# Patient Record
Sex: FEMALE | Race: ASIAN
[De-identification: names, ages, dates, MRNs, and addresses within clinical notes are randomized per-mention and may not be internally consistent; named-entity substitution may affect disease eponyms.]

---

## 2021-01-11 ENCOUNTER — HOSPITAL ENCOUNTER (EMERGENCY)
Dept: HOSPITAL 54 - ER | Age: 71
Discharge: HOME | End: 2021-01-11
Payer: MEDICARE

## 2021-01-11 VITALS — WEIGHT: 135 LBS | BODY MASS INDEX: 26.5 KG/M2 | HEIGHT: 60 IN

## 2021-01-11 VITALS — DIASTOLIC BLOOD PRESSURE: 88 MMHG | SYSTOLIC BLOOD PRESSURE: 154 MMHG

## 2021-01-11 DIAGNOSIS — R11.0: Primary | ICD-10-CM

## 2021-01-11 DIAGNOSIS — Z88.2: ICD-10-CM

## 2021-01-11 DIAGNOSIS — R55: ICD-10-CM

## 2021-01-11 DIAGNOSIS — R53.1: ICD-10-CM

## 2021-01-11 DIAGNOSIS — E11.9: ICD-10-CM

## 2021-01-11 LAB
ALBUMIN SERPL BCP-MCNC: 4.2 G/DL (ref 3.4–5)
ALP SERPL-CCNC: 69 U/L (ref 46–116)
ALT SERPL W P-5'-P-CCNC: 36 U/L (ref 12–78)
AST SERPL W P-5'-P-CCNC: 27 U/L (ref 15–37)
BASOPHILS # BLD AUTO: 0 /CMM (ref 0–0.2)
BASOPHILS NFR BLD AUTO: 0.3 % (ref 0–2)
BILIRUB DIRECT SERPL-MCNC: 0.1 MG/DL (ref 0–0.2)
BILIRUB SERPL-MCNC: 0.6 MG/DL (ref 0.2–1)
BUN SERPL-MCNC: 17 MG/DL (ref 7–18)
CALCIUM SERPL-MCNC: 8.7 MG/DL (ref 8.5–10.1)
CHLORIDE SERPL-SCNC: 100 MMOL/L (ref 98–107)
CO2 SERPL-SCNC: 30 MMOL/L (ref 21–32)
CREAT SERPL-MCNC: 1 MG/DL (ref 0.6–1.3)
EOSINOPHIL NFR BLD AUTO: 1 % (ref 0–6)
GLUCOSE SERPL-MCNC: 131 MG/DL (ref 74–106)
HCT VFR BLD AUTO: 41 % (ref 33–45)
HGB BLD-MCNC: 13.4 G/DL (ref 11.5–14.8)
LYMPHOCYTES NFR BLD AUTO: 0.8 /CMM (ref 0.8–4.8)
LYMPHOCYTES NFR BLD AUTO: 7.3 % (ref 20–44)
MCHC RBC AUTO-ENTMCNC: 33 G/DL (ref 31–36)
MCV RBC AUTO: 88 FL (ref 82–100)
MONOCYTES NFR BLD AUTO: 0.4 /CMM (ref 0.1–1.3)
MONOCYTES NFR BLD AUTO: 3.8 % (ref 2–12)
NEUTROPHILS # BLD AUTO: 10.1 /CMM (ref 1.8–8.9)
NEUTROPHILS NFR BLD AUTO: 87.6 % (ref 43–81)
PLATELET # BLD AUTO: 193 /CMM (ref 150–450)
POTASSIUM SERPL-SCNC: 3.3 MMOL/L (ref 3.5–5.1)
PROT SERPL-MCNC: 8 G/DL (ref 6.4–8.2)
RBC # BLD AUTO: 4.65 MIL/UL (ref 4–5.2)
SODIUM SERPL-SCNC: 137 MMOL/L (ref 136–145)
WBC NRBC COR # BLD AUTO: 11.5 K/UL (ref 4.3–11)

## 2021-01-11 NOTE — NUR
yessi, from dental office, c/o nausea s/p giving oral lidocaine, paramedics 
established IV peropheral line at LAC 18g. 100cc normal saline and 4mg zofran 
ivp given by paramedics en route to ED. to ER bed 12, hooked to monitor, 
patient aao X 4. breathing even and unlabored. changed to hosp gown, warm 
blanket provided, Dr Phelps at bedside

## 2021-01-11 NOTE — NUR
IV removed. Catheter intact and site benign. Pressure and 4x4 applied to site. 
No bleeding noted.Patient discharged to home eith daughter in stable condition. 
Written and verbal after care instructions given. Patient verbalizes 
understanding of instruction.

## 2021-03-09 ENCOUNTER — HOSPITAL ENCOUNTER (EMERGENCY)
Dept: HOSPITAL 54 - ER | Age: 71
Discharge: HOME | End: 2021-03-09
Payer: MEDICARE

## 2021-03-09 VITALS — BODY MASS INDEX: 26.5 KG/M2 | HEIGHT: 60 IN | WEIGHT: 135 LBS

## 2021-03-09 VITALS — DIASTOLIC BLOOD PRESSURE: 74 MMHG | SYSTOLIC BLOOD PRESSURE: 134 MMHG

## 2021-03-09 DIAGNOSIS — R11.0: Primary | ICD-10-CM

## 2021-03-09 DIAGNOSIS — Z88.2: ICD-10-CM

## 2021-03-09 DIAGNOSIS — E11.9: ICD-10-CM

## 2021-03-09 DIAGNOSIS — R51.9: ICD-10-CM

## 2021-03-09 DIAGNOSIS — Z95.5: ICD-10-CM

## 2021-03-09 PROCEDURE — 99283 EMERGENCY DEPT VISIT LOW MDM: CPT

## 2021-03-09 PROCEDURE — 96372 THER/PROPH/DIAG INJ SC/IM: CPT

## 2021-03-09 NOTE — NUR
PT BIBRA 860 FROM HOME C/O NAUSEA/DIZZINESS S/P TOOTH EXTRACTION. PT IS AAOX3 
[place] SPEAKING ONLY, NOT IN RESPIRATORY DISTRESS, HOOKED TO CARDIAC MONITOR, 
KEPT RESTED AND COMFORTABLE. WILL CONTINUE TO MONITOR.